# Patient Record
Sex: MALE | Race: WHITE | NOT HISPANIC OR LATINO | URBAN - METROPOLITAN AREA
[De-identification: names, ages, dates, MRNs, and addresses within clinical notes are randomized per-mention and may not be internally consistent; named-entity substitution may affect disease eponyms.]

---

## 2017-02-11 ENCOUNTER — EMERGENCY (EMERGENCY)
Facility: HOSPITAL | Age: 22
LOS: 1 days | Discharge: HOME | End: 2017-02-11
Attending: EMERGENCY MEDICINE | Admitting: EMERGENCY MEDICINE
Payer: COMMERCIAL

## 2017-02-11 VITALS
RESPIRATION RATE: 18 BRPM | HEART RATE: 88 BPM | SYSTOLIC BLOOD PRESSURE: 132 MMHG | OXYGEN SATURATION: 100 % | TEMPERATURE: 99 F | DIASTOLIC BLOOD PRESSURE: 90 MMHG

## 2017-02-11 VITALS — DIASTOLIC BLOOD PRESSURE: 77 MMHG | SYSTOLIC BLOOD PRESSURE: 124 MMHG

## 2017-02-11 DIAGNOSIS — T39.315A ADVERSE EFFECT OF PROPIONIC ACID DERIVATIVES, INITIAL ENCOUNTER: ICD-10-CM

## 2017-02-11 PROCEDURE — 99283 EMERGENCY DEPT VISIT LOW MDM: CPT

## 2017-02-11 NOTE — ED PROVIDER NOTE - PROGRESS NOTE DETAILS
Hives resolved. Patient asymptomatic and states that he is feeling much better. Williams Lopez, Resident.

## 2017-02-11 NOTE — ED ADULT NURSE NOTE - OBJECTIVE STATEMENT
23 y/o M , appropriate for age, immunizations UTD, no recent travel, reports taking naproxen and developed hives and a feeling of swelling to throat. Patient given 75 Mg benadryl total prior to arrival and IV placed left ac #20 by EMS with 1 L NS administering en route to ED.  Patient denies nausea, vomiting, abdominal pain. VSS. No swelling to mouth. speaking in full sentences. SKin warm dry and intact. cap refill 2 seconds. mucous membranes moist. pulses strong. skin warm dry and intact. some redness to eyes noted and hives to chest wall. Patient reports "I feel much better now" comfort and safety provided.

## 2017-02-11 NOTE — ED PROVIDER NOTE - MEDICAL DECISION MAKING DETAILS
Shama Rucker MD  patient took naproxen today and developed a rash, no wheezing, no abdominal symptoms, he had taken 25 mg of benadryl and then EMS gave him 5o mg IV, on exam no stridor, normal oropharynx, no wheezing, no abdominal tenderness, skin hives throughout; plan: observation, and reassessment.

## 2017-02-11 NOTE — ED PROVIDER NOTE - OBJECTIVE STATEMENT
22M [employer] presents with hives and feeling of throat swelling after taking naproxen this morning. EMS called and patient given 75mg diphenhydramine prior to arrival. In ED> patient states that he is feeling much better and the rash is disappearing. Denies shortness of breath, wheezing, cough, abdominal pain, nausea/vomiting.

## 2021-06-04 NOTE — ED ADULT NURSE NOTE - NS ED NOTE ABUSE SUSPICION NEGLECT YN
Refill Approved    Rx renewed per Medication Renewal Policy. Medication was last renewed on 9/30/19.    Aminah Mahoney, [facility] Triage/Med Refill 12/31/2019     Requested Prescriptions   Pending Prescriptions Disp Refills     losartan (COZAAR) 100 MG tablet [Pharmacy Med Name: LOSARTAN POTASSIUM 100MG TABS] 90 tablet 0     Sig: TAKE ONE TABLET BY MOUTH EVERY DAY       Angiotensin Receptor Blocker Protocol Passed - 12/30/2019 12:39 PM        Passed - PCP or prescribing provider visit in past 12 months       Last office visit with prescriber/PCP: 10/21/2019 Kalpesh Aldrich MD OR same dept: 10/21/2019 Kalpesh Aldrich MD OR same specialty: 10/21/2019 Kalpesh Aldrich MD  Last physical: 11/12/2019 Last MTM visit: Visit date not found   Next visit within 3 mo: Visit date not found  Next physical within 3 mo: Visit date not found  Prescriber OR PCP: Kalpesh Aldrich MD  Last diagnosis associated with med order: 1. Essential hypertension  - losartan (COZAAR) 100 MG tablet [Pharmacy Med Name: LOSARTAN POTASSIUM 100MG TABS]; TAKE ONE TABLET BY MOUTH EVERY DAY  Dispense: 90 tablet; Refill: 0    If protocol passes may refill for 12 months if within 3 months of last provider visit (or a total of 15 months).             Passed - Serum potassium within the past 12 months     Lab Results   Component Value Date    Potassium 4.1 11/06/2019             Passed - Blood pressure filed in past 12 months     BP Readings from Last 1 Encounters:   11/12/19 134/80             Passed - Serum creatinine within the past 12 months     Creatinine   Date Value Ref Range Status   11/06/2019 1.01 0.70 - 1.30 mg/dL Final                          no